# Patient Record
(demographics unavailable — no encounter records)

---

## 2025-06-12 NOTE — ASSESSMENT
[FreeTextEntry1] : Patient with history of multinodular goiter. US guided FNA angela nodules benign, stable on repeat US 5/2025, recommend continued monitoring with ultrasound May 2026.  RTO 1 year  I have answered their questions to the best of my ability.

## 2025-06-12 NOTE — HISTORY OF PRESENT ILLNESS
[de-identified] : Patient with history of multinodular goiter with recent follow up ultrasound March 2016. Right lobe 4.4 x 2.1 x 1.9 CM with dominant 12 x 12 x 14 mm. Left lobe 4 x 1.4 x 1.9 CM with 2 nodules largest 17 x 10 x 10 mm. Prior biopsy of bilateral dominant nodules August 2015 benign. Patient denies dysphagia, change in voice or recent illness. Thyroid ultrasound October 2016 with slight increase in right greater than left cysts. Patient denies dysphagia or change in voice. Patient with over 8-year hx of thyroid nodules, Patient denies dysphagia, hoarseness or SOB>  f/u US 6/19/19 with enlargement of angela thyroid nodules for US guided FNA 7/2019 benign. denies recent illness or symptoms, US 2/2023 stable nodules.  no suspicious findings. repeat US 4/2024 with increase in left mid nodule 2.7 cm, prior FNA AUS, negative genetics 2019.  Repeat biopsy June 2024: AUS, negative genetics.  Repeat ultrasound May 2025 with stable bilateral nodules. I have reviewed all old and new data and available images.

## 2025-06-12 NOTE — PHYSICAL EXAM
[de-identified] : No cervical or supraclavicular adenopathy, trachea is midline, left thyroid nodule 1.7 CM smoothed nontender no other discrete nodules. [Normal] : no neck adenopathy [de-identified] : Skin:  normal appearance.  no rash, nodules, vesicles, or erythema,\par  Musculoskeletal:  full range of motion and no deformities appreciated\par  Neurological:  grossly intact\par  Psychiatric:  oriented to person, place and time with appropriate affect